# Patient Record
Sex: MALE | Race: OTHER | Employment: FULL TIME | URBAN - NONMETROPOLITAN AREA
[De-identification: names, ages, dates, MRNs, and addresses within clinical notes are randomized per-mention and may not be internally consistent; named-entity substitution may affect disease eponyms.]

---

## 2017-04-08 DIAGNOSIS — I10 ESSENTIAL HYPERTENSION, BENIGN: ICD-10-CM

## 2017-04-08 DIAGNOSIS — L24.9 IRRITANT CONTACT DERMATITIS, UNSPECIFIED TRIGGER: ICD-10-CM

## 2017-04-08 RX ORDER — LISINOPRIL 10 MG/1
TABLET ORAL
Qty: 30 TABLET | Refills: 0 | Status: SHIPPED | OUTPATIENT
Start: 2017-04-08 | End: 2017-05-03

## 2017-04-08 RX ORDER — AMLODIPINE BESYLATE 5 MG/1
TABLET ORAL
Qty: 30 TABLET | Refills: 0 | Status: SHIPPED | OUTPATIENT
Start: 2017-04-08 | End: 2017-05-03

## 2017-04-08 NOTE — TELEPHONE ENCOUNTER
Amlodipine 5 mg #30/0, last refill 8/05/16   Lisinopril 10 mg #30/5, last refill  8/17/16  Last OV 8/17/16  Last Lipid, COMP 8/17/16

## 2017-04-10 RX ORDER — GLIMEPIRIDE 4 MG/1
4 TABLET ORAL
Qty: 30 TABLET | Refills: 0 | Status: SHIPPED | OUTPATIENT
Start: 2017-04-10 | End: 2017-11-06

## 2017-04-10 RX ORDER — LISINOPRIL 10 MG/1
10 TABLET ORAL DAILY
Qty: 30 TABLET | Refills: 5 | OUTPATIENT
Start: 2017-04-10

## 2017-04-10 RX ORDER — DESOXIMETASONE 0.5 MG/G
CREAM TOPICAL
Qty: 60 G | Refills: 0 | Status: SHIPPED | OUTPATIENT
Start: 2017-04-10 | End: 2017-11-07

## 2017-04-10 NOTE — TELEPHONE ENCOUNTER
Last office visit: 8/17/16, /86  Last A1c/CMP: 8/17/16   Last refill: Lisinopril 4/8/17 # 30 with 0 refills, requested too soon.                   Desoximetasone 8/17/16 # 60 G with 0 refills                 Glimepiride 8/18/16 # 30 with 2 refills

## 2017-04-10 NOTE — TELEPHONE ENCOUNTER
From: [facility]  To: Denys Manzo MD  Sent: 4/8/2017 9:29 AM CDT  Subject: Medication Renewal Request    Original authorizing provider: MD Casa Page would like a refill of the following medications:  lisinopril 10

## 2017-05-03 RX ORDER — LISINOPRIL 10 MG/1
TABLET ORAL
Qty: 30 TABLET | Refills: 0 | Status: SHIPPED | OUTPATIENT
Start: 2017-05-03 | End: 2017-11-06

## 2017-05-03 RX ORDER — AMLODIPINE BESYLATE 5 MG/1
TABLET ORAL
Qty: 30 TABLET | Refills: 0 | Status: SHIPPED | OUTPATIENT
Start: 2017-05-03 | End: 2017-11-06

## 2017-05-03 NOTE — TELEPHONE ENCOUNTER
Last office visit: 8/17/16, /80   Last CMP: 8/17/16  Last refill: Lisinopril 4/8/17 # 30 with 0 refills                 Norvasc 4/8/17 # 30 with 0 refills      No future appointments.  Patient is aware he has to schedule an appointment when he returns

## 2017-07-06 RX ORDER — LISINOPRIL 10 MG/1
TABLET ORAL
Qty: 30 TABLET | Refills: 0
Start: 2017-07-06

## 2017-07-06 RX ORDER — AMLODIPINE BESYLATE 5 MG/1
TABLET ORAL
Qty: 30 TABLET | Refills: 0
Start: 2017-07-06

## 2017-07-06 NOTE — TELEPHONE ENCOUNTER
From: Wong Bangura  Sent: 7/6/2017 9:51 AM CDT  Subject: Medication Renewal Request    Wong Bangura would like a refill of the following medications:  lisinopril 10 MG Oral Tab [Jaiden Salvador MD]  AmLODIPine Besylate 5 MG Oral Tab [Jaiden BOSTON

## 2017-11-06 ENCOUNTER — APPOINTMENT (OUTPATIENT)
Dept: LAB | Age: 57
End: 2017-11-06
Attending: FAMILY MEDICINE
Payer: COMMERCIAL

## 2017-11-06 ENCOUNTER — OFFICE VISIT (OUTPATIENT)
Dept: FAMILY MEDICINE CLINIC | Facility: CLINIC | Age: 57
End: 2017-11-06

## 2017-11-06 VITALS
HEART RATE: 90 BPM | OXYGEN SATURATION: 97 % | BODY MASS INDEX: 27.22 KG/M2 | WEIGHT: 169.38 LBS | DIASTOLIC BLOOD PRESSURE: 96 MMHG | TEMPERATURE: 99 F | SYSTOLIC BLOOD PRESSURE: 146 MMHG | HEIGHT: 66 IN

## 2017-11-06 DIAGNOSIS — Z00.00 LABORATORY EXAM ORDERED AS PART OF ROUTINE GENERAL MEDICAL EXAMINATION: ICD-10-CM

## 2017-11-06 DIAGNOSIS — Z00.00 ROUTINE GENERAL MEDICAL EXAMINATION AT A HEALTH CARE FACILITY: Primary | ICD-10-CM

## 2017-11-06 DIAGNOSIS — I10 POOR HYPERTENSION CONTROL: ICD-10-CM

## 2017-11-06 DIAGNOSIS — R73.9 HYPERGLYCEMIA: ICD-10-CM

## 2017-11-06 DIAGNOSIS — Z23 NEED FOR INFLUENZA VACCINATION: ICD-10-CM

## 2017-11-06 DIAGNOSIS — Z12.5 SCREENING PSA (PROSTATE SPECIFIC ANTIGEN): ICD-10-CM

## 2017-11-06 DIAGNOSIS — Z13.1 SCREENING FOR DIABETES MELLITUS: ICD-10-CM

## 2017-11-06 DIAGNOSIS — Z91.14 HX OF MEDICATION NONCOMPLIANCE: ICD-10-CM

## 2017-11-06 DIAGNOSIS — N52.9 ERECTILE DYSFUNCTION, UNSPECIFIED ERECTILE DYSFUNCTION TYPE: ICD-10-CM

## 2017-11-06 DIAGNOSIS — Z13.220 LIPID SCREENING: ICD-10-CM

## 2017-11-06 DIAGNOSIS — Z12.11 COLON CANCER SCREENING: ICD-10-CM

## 2017-11-06 PROCEDURE — 80061 LIPID PANEL: CPT | Performed by: FAMILY MEDICINE

## 2017-11-06 PROCEDURE — 82043 UR ALBUMIN QUANTITATIVE: CPT | Performed by: FAMILY MEDICINE

## 2017-11-06 PROCEDURE — 99396 PREV VISIT EST AGE 40-64: CPT | Performed by: FAMILY MEDICINE

## 2017-11-06 PROCEDURE — 36415 COLL VENOUS BLD VENIPUNCTURE: CPT | Performed by: FAMILY MEDICINE

## 2017-11-06 PROCEDURE — 90686 IIV4 VACC NO PRSV 0.5 ML IM: CPT | Performed by: FAMILY MEDICINE

## 2017-11-06 PROCEDURE — 90471 IMMUNIZATION ADMIN: CPT | Performed by: FAMILY MEDICINE

## 2017-11-06 PROCEDURE — 84153 ASSAY OF PSA TOTAL: CPT | Performed by: FAMILY MEDICINE

## 2017-11-06 PROCEDURE — 80053 COMPREHEN METABOLIC PANEL: CPT | Performed by: FAMILY MEDICINE

## 2017-11-06 PROCEDURE — 82570 ASSAY OF URINE CREATININE: CPT | Performed by: FAMILY MEDICINE

## 2017-11-06 PROCEDURE — 83036 HEMOGLOBIN GLYCOSYLATED A1C: CPT | Performed by: FAMILY MEDICINE

## 2017-11-06 RX ORDER — AMLODIPINE BESYLATE 5 MG/1
TABLET ORAL
Qty: 90 TABLET | Refills: 0 | Status: SHIPPED | OUTPATIENT
Start: 2017-11-06 | End: 2018-02-12

## 2017-11-06 RX ORDER — GLIMEPIRIDE 4 MG/1
4 TABLET ORAL
Qty: 90 TABLET | Refills: 0 | Status: SHIPPED | OUTPATIENT
Start: 2017-11-06 | End: 2018-09-04

## 2017-11-06 RX ORDER — SILDENAFIL 100 MG/1
100 TABLET, FILM COATED ORAL AS NEEDED
Qty: 4 TABLET | Refills: 0 | COMMUNITY
Start: 2017-11-06 | End: 2020-04-15 | Stop reason: ALTCHOICE

## 2017-11-06 RX ORDER — LISINOPRIL 10 MG/1
TABLET ORAL
Qty: 90 TABLET | Refills: 0 | Status: SHIPPED | OUTPATIENT
Start: 2017-11-06 | End: 2018-02-12

## 2017-11-06 NOTE — PROGRESS NOTES
Lisa Rowe is a 62year old male. CC:  Patient presents with:  Physical: room 5      HPI:  Patient here for follow-up. Physical exam as well. Patient has been out of his medicines for several months now.   He does not know his blood pres  08/17/2016 03:24 PM   K 3.5 (L) 08/17/2016 03:24 PM    08/17/2016 03:24 PM   CO2 27.0 08/17/2016 03:24 PM   CREATSERUM 1.22 08/17/2016 03:24 PM   CA 8.8 08/17/2016 03:24 PM   ALB 3.8 08/17/2016 03:24 PM   TP 7.4 08/17/2016 03:24 PM   ALKPHO anxiety  HEMATOLOGIC: denies hx of anemia  ENDOCRINE: denies thyroid history  ALL/ASTHMA: denies hx of allergy or asthma    EXAM:   Blood pressure 146/96, pulse 90, temperature 98.5 °F (36.9 °C), temperature source Temporal, height 66\", weight 169 lb 6 oz HEMOGLOBIN A1C    MICROALB/CREAT RATIO, RANDOM URINE    PSA SCREEN    Screening for diabetes mellitus        Relevant Orders    COMP METABOLIC PANEL (14)    HEMOGLOBIN A1C    Hyperglycemia        Relevant Medications    glimepiride 4 MG Oral Tab    Othe VACCINE QUAD PRESERVATIVE FREE 0.5 ML  -     glimepiride 4 MG Oral Tab;  Take 1 tablet (4 mg total) by mouth every morning before breakfast.          Meds & Refills for this Visit:  Signed Prescriptions Disp Refills    AmLODIPine Besylate 5 MG Oral Tab 90 t

## 2017-11-06 NOTE — ASSESSMENT & PLAN NOTE
Attempt 50 mg per dose, and if unsuccessful increased to 100 mg. Samples given. Will prescribe if this is working, stressed very strongly that this also may be a side effect of poor medical compliance with his sugar and blood pressure.

## 2017-11-07 DIAGNOSIS — L24.9 IRRITANT CONTACT DERMATITIS, UNSPECIFIED TRIGGER: ICD-10-CM

## 2017-11-07 RX ORDER — DESOXIMETASONE 0.5 MG/G
CREAM TOPICAL
Qty: 60 G | Refills: 0 | Status: SHIPPED | OUTPATIENT
Start: 2017-11-07 | End: 2018-02-14

## 2017-11-07 NOTE — TELEPHONE ENCOUNTER
From: James Mendez  Sent: 11/7/2017 4:30 PM CST  Subject: Medication Renewal Request    James Mendez would like a refill of the following medications:     Desoximetasone 0.05 % External Cream [Jaiden Salvador MD]    [facility]

## 2017-11-08 ENCOUNTER — PATIENT MESSAGE (OUTPATIENT)
Dept: FAMILY MEDICINE CLINIC | Facility: CLINIC | Age: 57
End: 2017-11-08

## 2017-11-08 NOTE — TELEPHONE ENCOUNTER
From: Carolina Sanchez  To: Keith Rodriguez MD  Sent: 11/8/2017 1:49 PM CST  Subject: Prescription Question    Dr. Kailee Salvador, I've seen the Lab. Results, do I need Glucophage in addition to Glimepiride?    Best regards and thanks,  [facility]

## 2017-12-11 RX ORDER — AZITHROMYCIN 250 MG/1
TABLET, FILM COATED ORAL
Qty: 6 TABLET | Refills: 0 | Status: SHIPPED | OUTPATIENT
Start: 2017-12-11 | End: 2017-12-16

## 2018-02-02 DIAGNOSIS — L24.9 IRRITANT CONTACT DERMATITIS, UNSPECIFIED TRIGGER: ICD-10-CM

## 2018-02-02 NOTE — TELEPHONE ENCOUNTER
Last office visit: 11/6/2017    Last refill: 11/7/2017    Pending Prescriptions Disp Refills    DESOXIMETASONE 0.05 % External Cream [Pharmacy Med Name: Rosa Higgins 0.05%   CRE]  0     Sig: APPLY A THIN LAYER OF CREAM TO AFFECTED AREA TWICE DAILY AS NEEDED

## 2018-02-05 RX ORDER — DESOXIMETASONE 0.5 MG/G
CREAM TOPICAL
Qty: 30 G | Refills: 0 | Status: SHIPPED | OUTPATIENT
Start: 2018-02-05 | End: 2018-02-14 | Stop reason: CLARIF

## 2018-02-12 DIAGNOSIS — I10 POOR HYPERTENSION CONTROL: ICD-10-CM

## 2018-02-12 RX ORDER — AMLODIPINE BESYLATE 5 MG/1
TABLET ORAL
Qty: 90 TABLET | Refills: 0 | Status: SHIPPED | OUTPATIENT
Start: 2018-02-12 | End: 2018-05-21

## 2018-02-12 RX ORDER — LISINOPRIL 10 MG/1
TABLET ORAL
Qty: 90 TABLET | Refills: 0 | Status: SHIPPED | OUTPATIENT
Start: 2018-02-12 | End: 2018-05-21

## 2018-02-12 NOTE — TELEPHONE ENCOUNTER
Lisinopril - last scripts for both 11/6/17 90 day   Amlodipine       Last BP 11/6/17  146/96  Last lab 11/6/17

## 2018-02-14 RX ORDER — DESOXIMETASONE 0.5 MG/G
CREAM TOPICAL
Qty: 60 G | Refills: 0 | Status: SHIPPED | OUTPATIENT
Start: 2018-02-14 | End: 2018-05-03

## 2018-05-03 DIAGNOSIS — L24.9 IRRITANT CONTACT DERMATITIS, UNSPECIFIED TRIGGER: ICD-10-CM

## 2018-05-04 RX ORDER — DESOXIMETASONE 0.5 MG/G
CREAM TOPICAL
Qty: 60 G | Refills: 0 | Status: SHIPPED | OUTPATIENT
Start: 2018-05-04 | End: 2018-06-14

## 2018-05-04 NOTE — TELEPHONE ENCOUNTER
From: [facility]  Sent: 5/3/2018 5:18 PM CDT  Subject: Medication Renewal Request    [facility] would like a refill of the following medications:     Desoximetasone 0.05 % External Cream [Jaiden Salvador MD]    Preferred pharmacy: [facility]

## 2018-05-21 DIAGNOSIS — I10 POOR HYPERTENSION CONTROL: ICD-10-CM

## 2018-05-21 RX ORDER — LISINOPRIL 10 MG/1
10 TABLET ORAL DAILY
Qty: 90 TABLET | Refills: 0 | Status: SHIPPED | OUTPATIENT
Start: 2018-05-21 | End: 2018-08-30

## 2018-05-21 RX ORDER — AMLODIPINE BESYLATE 5 MG/1
5 TABLET ORAL DAILY
Qty: 90 TABLET | Refills: 0 | Status: SHIPPED | OUTPATIENT
Start: 2018-05-21 | End: 2018-08-30

## 2018-05-21 NOTE — TELEPHONE ENCOUNTER
From: Kadeem Patel  Sent: 5/21/2018 12:28 AM CDT  Subject: Medication Renewal Request    Kadeem Patel would like a refill of the following medications:     LISINOPRIL 10 MG Oral Tab [Jaiden Salvador MD]     AMLODIPINE BESYLATE 5 MG Oral Tab

## 2018-06-14 DIAGNOSIS — L24.9 IRRITANT CONTACT DERMATITIS, UNSPECIFIED TRIGGER: ICD-10-CM

## 2018-06-14 NOTE — TELEPHONE ENCOUNTER
From: Brooklyn Yu  Sent: 6/14/2018 4:31 PM CDT  Subject: Medication Renewal Request    Brooklyn Yu would like a refill of the following medications:     Desoximetasone 0.05 % External Cream [Jaiden Salvador MD]    [facility]

## 2018-06-15 RX ORDER — DESOXIMETASONE 0.5 MG/G
CREAM TOPICAL
Qty: 60 G | Refills: 0 | Status: SHIPPED | OUTPATIENT
Start: 2018-06-15 | End: 2018-06-16

## 2018-06-16 ENCOUNTER — TELEPHONE (OUTPATIENT)
Dept: FAMILY MEDICINE CLINIC | Facility: CLINIC | Age: 58
End: 2018-06-16

## 2018-06-16 DIAGNOSIS — L24.9 IRRITANT CONTACT DERMATITIS, UNSPECIFIED TRIGGER: ICD-10-CM

## 2018-06-16 RX ORDER — DESOXIMETASONE 0.5 MG/G
CREAM TOPICAL
Qty: 60 G | Refills: 0 | COMMUNITY
Start: 2018-06-16 | End: 2018-07-29

## 2018-07-09 NOTE — TELEPHONE ENCOUNTER
From: Evelyne Kraus  Sent: 7/9/2018 10:49 AM CDT  Subject: Medication Renewal Request    Evelyne Kraus would like a refill of the following medications:     METFORMIN  MG Oral Tab [Jaiden Salvador MD]    [facility]

## 2018-07-29 DIAGNOSIS — L24.9 IRRITANT CONTACT DERMATITIS, UNSPECIFIED TRIGGER: ICD-10-CM

## 2018-07-30 RX ORDER — DESOXIMETASONE 0.5 MG/G
CREAM TOPICAL
Qty: 60 G | Refills: 0 | Status: SHIPPED
Start: 2018-07-30 | End: 2020-04-15

## 2018-07-30 NOTE — TELEPHONE ENCOUNTER
Last OV: 11/6/2017  Last filled: 6/16/2018 60g no RF    No future appointments.   [employer] message sent advising pt he is due for an office visit

## 2018-07-30 NOTE — TELEPHONE ENCOUNTER
From: Evelyne Kraus  Sent: 7/29/2018 8:39 AM CDT  Subject: Medication Renewal Request    Evelyne Kraus would like a refill of the following medications:     Desoximetasone 0.05 % External Cream    Pref[facility] pharmacy: [id number]

## 2018-08-11 NOTE — TELEPHONE ENCOUNTER
From: Wong Bangura  Sent: 8/10/2018 4:32 PM CDT  Subject: Medication Renewal Request    Wong Bangura would like a refill of the following medications:     MetFORMIN HCl 500 MG Oral Tab [Jaiden Salvador MD]    Preferred [facility]: [facility]

## 2018-08-11 NOTE — TELEPHONE ENCOUNTER
Last OV: 11/6/2017  Last labs: 11/6/2017  Last filled: 7/9/20118 #30 no RF    No future appointments.

## 2018-08-30 DIAGNOSIS — I10 POOR HYPERTENSION CONTROL: ICD-10-CM

## 2018-08-30 NOTE — TELEPHONE ENCOUNTER
Last office visit 11-6-17 146/96  Last refill both meds 5-21-18 #90 of each  No future appointments. Requested refills of 90 reduced to 30. ugichemt message sent to patient advising he needs to be seen and have labs done.

## 2018-08-30 NOTE — TELEPHONE ENCOUNTER
From: Lorena Cohn  Sent: 8/30/2018 3:00 PM CDT  Subject: Medication Renewal Request    Lorena Cohn would like a refill of the following medications:     lisinopril 10 MG Oral Tab [Jaiden Salvador MD]     AmLODIPine Besylate 5 MG Oral Tab

## 2018-08-31 RX ORDER — AMLODIPINE BESYLATE 5 MG/1
5 TABLET ORAL DAILY
Qty: 30 TABLET | Refills: 0 | Status: SHIPPED | OUTPATIENT
Start: 2018-08-31 | End: 2018-11-02

## 2018-08-31 RX ORDER — LISINOPRIL 10 MG/1
10 TABLET ORAL DAILY
Qty: 30 TABLET | Refills: 0 | Status: SHIPPED | OUTPATIENT
Start: 2018-08-31 | End: 2018-11-02

## 2018-09-04 ENCOUNTER — PATIENT MESSAGE (OUTPATIENT)
Dept: FAMILY MEDICINE CLINIC | Facility: CLINIC | Age: 58
End: 2018-09-04

## 2018-09-04 RX ORDER — GLIMEPIRIDE 4 MG/1
TABLET ORAL
Qty: 30 TABLET | Refills: 0 | Status: SHIPPED | OUTPATIENT
Start: 2018-09-04 | End: 2020-04-15 | Stop reason: ALTCHOICE

## 2018-09-04 NOTE — TELEPHONE ENCOUNTER
From: Melinda Christina  To: Clive Joe MD  Sent: 9/4/2018 11:09 AM CDT  Subject: Other    ran-out of test blood sugar strips  \"FREESTYLE LITE Blood Glucose Test   Strips\". ..  thanks. ..

## 2018-09-04 NOTE — TELEPHONE ENCOUNTER
Last OV: 11/6/2017  Last labs: 11/6/2017  Last filled: 11/6/2017 #90 no RF    No future appointments.

## 2018-09-06 RX ORDER — BLOOD-GLUCOSE METER
KIT MISCELLANEOUS
Qty: 100 STRIP | Refills: 0 | Status: SHIPPED | OUTPATIENT
Start: 2018-09-06 | End: 2020-05-04

## 2018-09-17 DIAGNOSIS — L24.9 IRRITANT CONTACT DERMATITIS, UNSPECIFIED TRIGGER: ICD-10-CM

## 2018-09-17 RX ORDER — DESOXIMETASONE 0.5 MG/G
CREAM TOPICAL
Qty: 15 G | Refills: 0 | Status: SHIPPED | OUTPATIENT
Start: 2018-09-17 | End: 2020-04-15 | Stop reason: ALTCHOICE

## 2018-09-17 NOTE — TELEPHONE ENCOUNTER
Last OV: 11/6/2017  Last filled: 7/30/2018 60g no RF    Reduced amount dispensed as pt needs to be seen

## 2018-11-02 DIAGNOSIS — I10 POOR HYPERTENSION CONTROL: ICD-10-CM

## 2018-11-02 RX ORDER — AMLODIPINE BESYLATE 5 MG/1
TABLET ORAL
Qty: 30 TABLET | Refills: 0 | Status: SHIPPED | OUTPATIENT
Start: 2018-11-02 | End: 2019-01-30

## 2018-11-02 RX ORDER — LISINOPRIL 10 MG/1
TABLET ORAL
Qty: 30 TABLET | Refills: 0 | Status: SHIPPED | OUTPATIENT
Start: 2018-11-02 | End: 2019-01-30

## 2018-11-02 NOTE — TELEPHONE ENCOUNTER
Jaiden Patel Nurse   Caller: Jarod Panchal (Today, 10:59 AM)             RETURNING MARISSA B'S. CALL. PLEASE CALL BACK. Wife advised and verbalizes understanding.

## 2018-11-02 NOTE — TELEPHONE ENCOUNTER
Last office visit 11-6-17 146/96  Last refill both meds 8-31-18 #30 of each. No future appointments. Overdue for labs. Left message to call back.

## 2019-01-30 DIAGNOSIS — I10 POOR HYPERTENSION CONTROL: ICD-10-CM

## 2019-01-30 NOTE — TELEPHONE ENCOUNTER
Last office visit 11-6-17 146/96  Last refill Amlodipine, Lisinopril 11-2-18 #30 with note needs office visit. No refills will be authorized until patient is seen in office. Last refill Metformin 8-11-18 with note needs office visit and labs.   No further

## 2019-01-31 RX ORDER — LISINOPRIL 10 MG/1
10 TABLET ORAL
Qty: 15 TABLET | Refills: 0 | Status: SHIPPED | OUTPATIENT
Start: 2019-01-31 | End: 2019-03-11

## 2019-01-31 RX ORDER — AMLODIPINE BESYLATE 5 MG/1
5 TABLET ORAL
Qty: 15 TABLET | Refills: 0 | Status: SHIPPED | OUTPATIENT
Start: 2019-01-31 | End: 2019-03-11

## 2019-02-01 ENCOUNTER — PATIENT OUTREACH (OUTPATIENT)
Dept: FAMILY MEDICINE CLINIC | Facility: CLINIC | Age: 59
End: 2019-02-01

## 2019-02-22 ENCOUNTER — PATIENT OUTREACH (OUTPATIENT)
Dept: FAMILY MEDICINE CLINIC | Facility: CLINIC | Age: 59
End: 2019-02-22

## 2019-03-11 DIAGNOSIS — I10 POOR HYPERTENSION CONTROL: ICD-10-CM

## 2019-03-11 RX ORDER — LISINOPRIL 10 MG/1
10 TABLET ORAL
Qty: 7 TABLET | Refills: 0 | Status: SHIPPED | OUTPATIENT
Start: 2019-03-11 | End: 2020-04-15

## 2019-03-11 RX ORDER — AMLODIPINE BESYLATE 5 MG/1
5 TABLET ORAL
Qty: 7 TABLET | Refills: 0 | Status: SHIPPED | OUTPATIENT
Start: 2019-03-11 | End: 2020-04-15

## 2019-03-11 NOTE — TELEPHONE ENCOUNTER
Last OV; 11/6/2017  Last labs: 11/6/2017  All meds last filled 1/31/2019 for #15    No future appointments.

## 2019-05-20 ENCOUNTER — PATIENT OUTREACH (OUTPATIENT)
Dept: FAMILY MEDICINE CLINIC | Facility: CLINIC | Age: 59
End: 2019-05-20

## 2020-04-15 ENCOUNTER — TELEMEDICINE (OUTPATIENT)
Dept: FAMILY MEDICINE CLINIC | Facility: CLINIC | Age: 60
End: 2020-04-15
Payer: COMMERCIAL

## 2020-04-15 DIAGNOSIS — R73.9 HYPERGLYCEMIA: ICD-10-CM

## 2020-04-15 DIAGNOSIS — S33.9XXA SPRAIN OF LIGAMENT OF LUMBOSACRAL JOINT, INITIAL ENCOUNTER: ICD-10-CM

## 2020-04-15 DIAGNOSIS — I10 POOR HYPERTENSION CONTROL: ICD-10-CM

## 2020-04-15 DIAGNOSIS — I10 ESSENTIAL HYPERTENSION, BENIGN: ICD-10-CM

## 2020-04-15 DIAGNOSIS — Z79.899 ENCOUNTER FOR LONG-TERM (CURRENT) USE OF MEDICATIONS: Primary | ICD-10-CM

## 2020-04-15 PROCEDURE — 99214 OFFICE O/P EST MOD 30 MIN: CPT | Performed by: FAMILY MEDICINE

## 2020-04-15 RX ORDER — LISINOPRIL 10 MG/1
10 TABLET ORAL
Qty: 7 TABLET | Refills: 0 | Status: CANCELLED | OUTPATIENT
Start: 2020-04-15

## 2020-04-15 RX ORDER — AMLODIPINE BESYLATE 5 MG/1
5 TABLET ORAL
Qty: 7 TABLET | Refills: 0 | Status: CANCELLED | OUTPATIENT
Start: 2020-04-15

## 2020-04-15 RX ORDER — LISINOPRIL 10 MG/1
10 TABLET ORAL
Qty: 90 TABLET | Refills: 0 | Status: SHIPPED | OUTPATIENT
Start: 2020-04-15 | End: 2020-05-04 | Stop reason: DRUGHIGH

## 2020-04-15 RX ORDER — AMLODIPINE BESYLATE 5 MG/1
5 TABLET ORAL
Qty: 90 TABLET | Refills: 0 | Status: SHIPPED | OUTPATIENT
Start: 2020-04-15 | End: 2020-05-13

## 2020-04-15 NOTE — TELEPHONE ENCOUNTER
Amlodipine: 3/11/19 #7 w/ 0 refills  Lisinopril: 3/11/19 #7 w/ 0 refills  Metformin: 3/11/19 #7 w/ 0 refills    Last OV: 11/6/17  Last labs: 11/6/17    No future appointments.

## 2020-04-15 NOTE — PROGRESS NOTES
Virtual/Telephone Check-In    [facility]  verbally consents to a Virtual/Telephone Check-In service on 4/15/2020 .   Patient understands and accepts financial responsibility for any deductible, co-insurance and/or co-pays associated with this ser allergies and chart reviewed    COVID-19 protocol      HPI:   Alyssa Monreal is a 61year old male who   Patient has not been in the office for over 2 years. Because of his conditions we have been refilling medicines.     We were able to establish a Tab Take 1 tablet (5 mg total) by mouth once daily. 90 tablet 0   • Multiple Vitamins-Minerals (MULTI ADULT GUMMIES OR) Take by mouth.         Past Medical History:   Diagnosis Date   • Essential hypertension, benign 1/8/2016   • Poor hypertension control 8 hypertension control    Current Assessment & Plan     Refill medication but patient does need to send home blood pressure readings to me when COVID crisis is over we will check in the office           Relevant Medications    lisinopril 10 MG Oral Tab    am are limitations of this visit as no physical exam could be performed. Every conscious effort was taken to allow for sufficient and adequate time. This billing was spent on reviewing labs, medications, radiology tests and decision making.   Appropriate med

## 2020-04-15 NOTE — ASSESSMENT & PLAN NOTE
Refill medication but patient does need to send home blood pressure readings to me when COVID crisis is over we will check in the office

## 2020-04-15 NOTE — TELEPHONE ENCOUNTER
There is been no office visit for 2-1/2 or more years. Patient is indicated as having my chart. We gave him limited refills hoping that we would hear from him.     Let him know that we are capable of doing a video visit which is preferred before I would

## 2020-04-16 ENCOUNTER — TELEPHONE (OUTPATIENT)
Dept: FAMILY MEDICINE CLINIC | Facility: CLINIC | Age: 60
End: 2020-04-16

## 2020-04-16 VITALS — WEIGHT: 160.5 LBS | SYSTOLIC BLOOD PRESSURE: 171 MMHG | BODY MASS INDEX: 26 KG/M2 | DIASTOLIC BLOOD PRESSURE: 99 MMHG

## 2020-04-16 NOTE — TELEPHONE ENCOUNTER
DID PHONE VISIT YESTERDAY WITH , WAS TOLD TO CALL BACK WITH THE FOLLOWING TO BE PUT IN HIS RECORD: HIS GLUCOSE READING TODAY 97, WEIGHT 160.5, & /99

## 2020-04-16 NOTE — TELEPHONE ENCOUNTER
Detailed message left for patient of Dr. Madhav Link recommendations. Patient instructed to call back if he has any questions or concerns. Patient also notified that his paperwork has been faxed to his employer.

## 2020-04-16 NOTE — TELEPHONE ENCOUNTER
Patient's blood pressure is elevated. I would consider increasing his lisinopril, from the 10 mg daily, up to 2 tablets of the 10 mg equaling 20 mg daily he can take them both at the same time.   Then also I would have him keep a log of daily blood pressur

## 2020-04-16 NOTE — TELEPHONE ENCOUNTER
Dr Salvador   I added vitals per pt and glucose result to yesterdays video visit    Please note elevated B/P reading 171/99

## 2020-05-01 RX ORDER — LISINOPRIL 20 MG/1
20 TABLET ORAL DAILY
Qty: 90 TABLET | Refills: 0 | Status: SHIPPED | OUTPATIENT
Start: 2020-05-01 | End: 2020-07-30

## 2020-05-01 NOTE — TELEPHONE ENCOUNTER
Last Office Visit: telemed vist 4/15/2020  Last Refill: 4/15/20 #90 no refill  Last Labs: 2017, next labs ordered    Spoke with patient. He states at the telemed visit Dr. Clayton Esparza increased him from 10 mg to 20 mg. He has doubling his med now.   His BP toda

## 2020-05-01 NOTE — TELEPHONE ENCOUNTER
This is good  I sent prescription    Please have him set up  a   telehealth/video visit as able week of 5/4/2020 to discuss blood pressure again

## 2020-05-04 ENCOUNTER — TELEMEDICINE (OUTPATIENT)
Dept: FAMILY MEDICINE CLINIC | Facility: CLINIC | Age: 60
End: 2020-05-04
Payer: COMMERCIAL

## 2020-05-04 DIAGNOSIS — Z79.899 ENCOUNTER FOR LONG-TERM (CURRENT) USE OF MEDICATIONS: Primary | ICD-10-CM

## 2020-05-04 DIAGNOSIS — I10 ESSENTIAL HYPERTENSION, BENIGN: ICD-10-CM

## 2020-05-04 DIAGNOSIS — R73.9 HYPERGLYCEMIA: ICD-10-CM

## 2020-05-04 PROCEDURE — 99214 OFFICE O/P EST MOD 30 MIN: CPT | Performed by: FAMILY MEDICINE

## 2020-05-04 RX ORDER — BLOOD-GLUCOSE METER
KIT MISCELLANEOUS
Qty: 100 STRIP | Refills: 0 | Status: SHIPPED | OUTPATIENT
Start: 2020-05-04 | End: 2021-05-02

## 2020-05-04 NOTE — PROGRESS NOTES
Virtual/Telephone Check-In    Shane Hernandez  verbally consents to a Virtual/Telephone Check-In service on 5/4/2020 .   Patient understands and accepts financial responsibility for any deductible, co-insurance and/or co-pays associated with this serv to 30 days. 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         Allerg oriented during the business the visit, and had no difficulty with respiration. He was appropriate. No obvious signs of dyspnea.     ASSESSMENT AND PLAN:     Encounter for long-term (current) use of medications  (primary encounter diagnosis)  Essential hy this Visit:  Requested Prescriptions     Signed Prescriptions Disp Refills   • Glucose Blood (FREESTYLE LITE TEST) In Vitro Strip 100 strip 0     Sig: Use as directed to test blood sugar up to 3 times daily       Imaging & Consults:  None    Return in abou

## 2020-05-13 DIAGNOSIS — I10 ESSENTIAL HYPERTENSION, BENIGN: ICD-10-CM

## 2020-05-13 DIAGNOSIS — R73.9 HYPERGLYCEMIA: ICD-10-CM

## 2020-05-13 DIAGNOSIS — I10 POOR HYPERTENSION CONTROL: ICD-10-CM

## 2020-05-13 RX ORDER — AMLODIPINE BESYLATE 5 MG/1
5 TABLET ORAL
Qty: 90 TABLET | Refills: 0 | Status: SHIPPED | OUTPATIENT
Start: 2020-05-13 | End: 2020-08-11

## 2020-05-13 NOTE — TELEPHONE ENCOUNTER
Metformin  Last refill: 07/14/20  Qty: 90  W/ 0 refills  Last ov: 05/04/20    Amlodipine   Last refill: 04/15/20  Qty: 90  W/ 0 refills  Last ov: 05/04/20      Requested Prescriptions     Pending Prescriptions Disp Refills   • metFORMIN HCl 500 MG Oral Tab

## 2020-06-11 ENCOUNTER — MED REC SCAN ONLY (OUTPATIENT)
Dept: FAMILY MEDICINE CLINIC | Facility: CLINIC | Age: 60
End: 2020-06-11

## 2020-11-30 DIAGNOSIS — I10 POOR HYPERTENSION CONTROL: ICD-10-CM

## 2020-11-30 DIAGNOSIS — I10 ESSENTIAL HYPERTENSION, BENIGN: ICD-10-CM

## 2020-12-01 RX ORDER — AMLODIPINE BESYLATE 5 MG/1
TABLET ORAL
Qty: 30 TABLET | Refills: 0 | Status: SHIPPED | OUTPATIENT
Start: 2020-12-01 | End: 2021-04-26

## 2020-12-01 NOTE — TELEPHONE ENCOUNTER
LOV 05/04/2020 telemedicine    LAST LAB 11/06/2017    LAST RX  Amlodipine #90 R0 05/13/2020    Next OV No future appointments.       PROTOCOL    Hypertension Medications Protocol Tegrnk6211/30/2020 04:57 PM   CMP or BMP in past 12 months Protocol Details    A

## 2020-12-20 ENCOUNTER — PATIENT MESSAGE (OUTPATIENT)
Dept: FAMILY MEDICINE CLINIC | Facility: CLINIC | Age: 60
End: 2020-12-20

## 2020-12-21 NOTE — TELEPHONE ENCOUNTER
From: Ольга Kellogg  To: Sid William MD  Sent: 12/20/2020 1:38 AM CST  Subject: Other    hi Dr. Massimo Sahni, this is in regards to scheduling vaccination in our facility, my question is, I have a history of Hives in my medical records, can I get vaccina

## 2021-01-16 ENCOUNTER — IMMUNIZATION (OUTPATIENT)
Dept: LAB | Age: 61
End: 2021-01-16

## 2021-01-16 DIAGNOSIS — Z23 NEED FOR VACCINATION: Primary | ICD-10-CM

## 2021-01-16 PROCEDURE — 0011A COVID-19 MODERNA VACCINE: CPT | Performed by: NURSE PRACTITIONER

## 2021-01-16 PROCEDURE — 91301 COVID-19 MODERNA VACCINE: CPT | Performed by: NURSE PRACTITIONER

## 2021-02-13 ENCOUNTER — IMMUNIZATION (OUTPATIENT)
Dept: LAB | Age: 61
End: 2021-02-13
Attending: NURSE PRACTITIONER

## 2021-02-13 DIAGNOSIS — Z23 NEED FOR VACCINATION: Primary | ICD-10-CM

## 2021-02-13 PROCEDURE — 91301 COVID-19 MODERNA VACCINE: CPT

## 2021-02-13 PROCEDURE — 0012A COVID-19 MODERNA VACCINE: CPT

## 2021-04-22 DIAGNOSIS — R73.9 HYPERGLYCEMIA: ICD-10-CM

## 2021-04-23 NOTE — TELEPHONE ENCOUNTER
Last OV: 5/4/20 (telemed)  Last labs: 2017    No future appointments. Due for labs & OV-- [facility] sent.

## 2021-04-26 ENCOUNTER — OFFICE VISIT (OUTPATIENT)
Dept: FAMILY MEDICINE CLINIC | Facility: CLINIC | Age: 61
End: 2021-04-26
Payer: COMMERCIAL

## 2021-04-26 VITALS
RESPIRATION RATE: 12 BRPM | WEIGHT: 161.5 LBS | HEIGHT: 65 IN | TEMPERATURE: 98 F | BODY MASS INDEX: 26.91 KG/M2 | SYSTOLIC BLOOD PRESSURE: 118 MMHG | DIASTOLIC BLOOD PRESSURE: 88 MMHG | OXYGEN SATURATION: 95 % | HEART RATE: 104 BPM

## 2021-04-26 DIAGNOSIS — Z12.11 SCREEN FOR COLON CANCER: ICD-10-CM

## 2021-04-26 DIAGNOSIS — N52.9 ERECTILE DYSFUNCTION, UNSPECIFIED ERECTILE DYSFUNCTION TYPE: ICD-10-CM

## 2021-04-26 DIAGNOSIS — Z00.00 WELL ADULT EXAM: Primary | ICD-10-CM

## 2021-04-26 DIAGNOSIS — R73.9 HYPERGLYCEMIA: ICD-10-CM

## 2021-04-26 DIAGNOSIS — I10 ESSENTIAL HYPERTENSION, BENIGN: ICD-10-CM

## 2021-04-26 PROCEDURE — 83036 HEMOGLOBIN GLYCOSYLATED A1C: CPT | Performed by: FAMILY MEDICINE

## 2021-04-26 PROCEDURE — 82570 ASSAY OF URINE CREATININE: CPT | Performed by: FAMILY MEDICINE

## 2021-04-26 PROCEDURE — 3008F BODY MASS INDEX DOCD: CPT | Performed by: FAMILY MEDICINE

## 2021-04-26 PROCEDURE — 3079F DIAST BP 80-89 MM HG: CPT | Performed by: FAMILY MEDICINE

## 2021-04-26 PROCEDURE — 99396 PREV VISIT EST AGE 40-64: CPT | Performed by: FAMILY MEDICINE

## 2021-04-26 PROCEDURE — 3074F SYST BP LT 130 MM HG: CPT | Performed by: FAMILY MEDICINE

## 2021-04-26 PROCEDURE — 82043 UR ALBUMIN QUANTITATIVE: CPT | Performed by: FAMILY MEDICINE

## 2021-04-26 RX ORDER — LISINOPRIL 10 MG/1
TABLET ORAL
COMMUNITY
End: 2021-04-26

## 2021-04-26 RX ORDER — LISINOPRIL 10 MG/1
10 TABLET ORAL DAILY
Qty: 30 TABLET | Refills: 5 | Status: SHIPPED | OUTPATIENT
Start: 2021-04-26 | End: 2021-10-04

## 2021-04-26 RX ORDER — AMLODIPINE BESYLATE 5 MG/1
5 TABLET ORAL DAILY
Qty: 90 TABLET | Refills: 1 | Status: SHIPPED | OUTPATIENT
Start: 2021-04-26 | End: 2021-10-04

## 2021-04-26 RX ORDER — SILDENAFIL 100 MG/1
100 TABLET, FILM COATED ORAL AS NEEDED
Qty: 10 TABLET | Refills: 1 | Status: SHIPPED | OUTPATIENT
Start: 2021-04-26

## 2021-04-27 NOTE — PROGRESS NOTES
Eran George is a 61year old male. CC:  Patient presents with:  CPX: inrm.  6      HPI/Subjective:    Chief Complaint Reviewed and Verified  Nursing Notes Reviewed and   Verified  Tobacco Reviewed  Allergies Reviewed  Medications Revi Vaccine (23520)                          11/02/2019        Wt Readings from Last 6 Encounters:  04/26/21 : 161 lb 8 oz (73.3 kg)  04/16/20 : 160 lb 8 oz (72.8 kg)  11/06/17 : 169 lb 6 oz (76.8 kg)  08/17/16 : 169 lb (76.7 kg)  01/06/16 : 170 lb 6 oz (77.3 Addressed This Visit        Unprioritized    Essential hypertension, benign    Overview     Blood Pressure and Cardiac Medications          lisinopril 20 MG Oral Tab    amLODIPine Besylate 5 MG Oral Tab                 Current Assessment & Plan     As for Tab 90 tablet 1     Sig: Take 1 tablet (500 mg total) by mouth daily with breakfast.       Imaging & Consults:  None    No follow-ups on file.     Lisa Willams M.D., FAAFP

## 2021-05-22 DIAGNOSIS — I10 ESSENTIAL HYPERTENSION, BENIGN: ICD-10-CM

## 2021-05-22 RX ORDER — AMLODIPINE BESYLATE 5 MG/1
5 TABLET ORAL DAILY
Qty: 90 TABLET | Refills: 1 | Status: CANCELLED | OUTPATIENT
Start: 2021-05-22 | End: 2021-11-18

## 2021-05-22 RX ORDER — LISINOPRIL 10 MG/1
10 TABLET ORAL DAILY
Qty: 30 TABLET | Refills: 5 | Status: CANCELLED | OUTPATIENT
Start: 2021-05-22 | End: 2021-11-18

## 2021-05-22 NOTE — TELEPHONE ENCOUNTER
Amlodipine: 4/26/21 #960 w/ 1 refill-- 30 day was dispensed  Lisinopril:  4/26/21 #30 w/ 5 refills    Last OV: 4/26/21  Last labs: 4/26/21-- A1C and Micro only    No future appointments.         Dispense report shows pt only received 30 days when he

## 2021-10-04 DIAGNOSIS — I10 ESSENTIAL HYPERTENSION, BENIGN: ICD-10-CM

## 2021-10-04 DIAGNOSIS — R73.9 HYPERGLYCEMIA: ICD-10-CM

## 2021-10-04 RX ORDER — AMLODIPINE BESYLATE 5 MG/1
5 TABLET ORAL DAILY
Qty: 90 TABLET | Refills: 0 | Status: SHIPPED | OUTPATIENT
Start: 2021-10-04 | End: 2022-04-02

## 2021-10-04 RX ORDER — LISINOPRIL 10 MG/1
10 TABLET ORAL DAILY
Qty: 30 TABLET | Refills: 5 | Status: SHIPPED | OUTPATIENT
Start: 2021-10-04 | End: 2022-04-02

## 2021-10-04 NOTE — TELEPHONE ENCOUNTER
Lisinopril  Last refill: 04/26/21  Qty: 30  W/ 5 refills  Last ov: 04/26/21    Amlodipine   Last refill: 04/26/21  Qty: 90  W/ 1 refills  Last oV: 04/26/21    Metformin  Last refill: 04/26/21  Qty: 90  W/ 1 refills  Last ov: 04/26/21    Requested Prescript

## 2022-03-31 ENCOUNTER — TELEPHONE (OUTPATIENT)
Dept: FAMILY MEDICINE CLINIC | Facility: CLINIC | Age: 62
End: 2022-03-31

## 2022-08-01 ENCOUNTER — EMPLOYEE HEALTH (OUTPATIENT)
Dept: OTHER | Age: 62
End: 2022-08-01

## 2022-08-03 ENCOUNTER — APPOINTMENT (OUTPATIENT)
Dept: LAB | Age: 62
End: 2022-08-03

## 2022-08-06 ENCOUNTER — MOBILE ENCOUNTER (OUTPATIENT)
Dept: FAMILY MEDICINE CLINIC | Facility: CLINIC | Age: 62
End: 2022-08-06

## 2022-08-06 RX ORDER — NIRMATRELVIR AND RITONAVIR 300-100 MG
KIT ORAL
Qty: 30 TABLET | Refills: 0 | Status: SHIPPED | OUTPATIENT
Start: 2022-08-06 | End: 2022-08-11

## 2022-08-09 ENCOUNTER — EMPLOYEE HEALTH (OUTPATIENT)
Dept: OTHER | Age: 62
End: 2022-08-09

## 2022-08-11 ENCOUNTER — EMPLOYEE HEALTH (OUTPATIENT)
Dept: OTHER | Age: 62
End: 2022-08-11

## 2022-08-19 ENCOUNTER — EMPLOYEE HEALTH (OUTPATIENT)
Dept: OTHER | Age: 62
End: 2022-08-19

## 2022-12-12 ENCOUNTER — MED REC SCAN ONLY (OUTPATIENT)
Dept: FAMILY MEDICINE CLINIC | Facility: CLINIC | Age: 62
End: 2022-12-12

## 2023-01-31 ENCOUNTER — EMPLOYEE HEALTH (OUTPATIENT)
Dept: OTHER | Age: 63
End: 2023-01-31

## 2023-01-31 ENCOUNTER — TELEPHONE (OUTPATIENT)
Dept: FAMILY MEDICINE CLINIC | Facility: CLINIC | Age: 63
End: 2023-01-31

## 2023-01-31 DIAGNOSIS — U07.1 COVID-19: Primary | ICD-10-CM

## 2023-01-31 NOTE — TELEPHONE ENCOUNTER
Wife calls  Patient positive COVID-19  Signs or symptoms bodya aches  Chills cough  No fever  Will send paxlovid to [facility]

## 2023-02-06 ENCOUNTER — EMPLOYEE HEALTH (OUTPATIENT)
Dept: OTHER | Age: 63
End: 2023-02-06

## 2023-03-01 ENCOUNTER — PATIENT OUTREACH (OUTPATIENT)
Dept: FAMILY MEDICINE CLINIC | Facility: CLINIC | Age: 63
End: 2023-03-01

## 2024-01-09 DIAGNOSIS — I10 ESSENTIAL HYPERTENSION, BENIGN: ICD-10-CM

## 2024-01-09 DIAGNOSIS — R73.9 HYPERGLYCEMIA: ICD-10-CM

## 2024-01-09 DIAGNOSIS — N52.9 ERECTILE DYSFUNCTION, UNSPECIFIED ERECTILE DYSFUNCTION TYPE: ICD-10-CM

## 2024-01-16 RX ORDER — SILDENAFIL 100 MG/1
100 TABLET, FILM COATED ORAL AS NEEDED
Qty: 10 TABLET | Refills: 1 | Status: CANCELLED | OUTPATIENT
Start: 2024-01-16

## 2024-01-16 NOTE — TELEPHONE ENCOUNTER
Pt requesting 30 day refills on lisinopril 10 mg, Norvasc 5 mg, and metformin 500 mg, see my chart message patient is aware needs office visit but unable to schedule at this time due to work.    LOV  4-26-21    LAST LAB  4-26-21    LAST RX   Lisinopril 10-4-21 (?)  #30  RF 5                     Norvasc 10-4-21 (?) #90                     Metformin 10-4-21 #90    Next OV  No future appointments.      PROTOCOL  Hypertension Medications Protocol Exuafg0601/16/2024 04:44 PM   Protocol Details CMP or BMP in past 12 months    Appointment in past 6 or next 3 months    Last serum creatinine< 2.0     Diabetic Medication Protocol Lpbfoc4601/16/2024 04:44 PM   Protocol Details HgBA1C procedure resulted in past 6 months    Last HgBA1C < 7.5    Microalbumin procedure in past 12 months or taking ACE/ARB    Appointment in past 6 or next 3 months

## 2024-01-16 NOTE — TELEPHONE ENCOUNTER
See My Chart message, phone number updated as requested. Message sent back to see if patient can provide additional information for main in pharmacy.

## 2024-01-17 RX ORDER — LISINOPRIL 10 MG/1
10 TABLET ORAL DAILY
Qty: 30 TABLET | Refills: 5 | Status: SHIPPED | OUTPATIENT
Start: 2024-01-17 | End: 2024-07-15

## 2024-01-17 RX ORDER — AMLODIPINE BESYLATE 5 MG/1
5 TABLET ORAL DAILY
Qty: 30 TABLET | Refills: 0 | Status: SHIPPED | OUTPATIENT
Start: 2024-01-17 | End: 2024-07-15

## 2024-06-02 DIAGNOSIS — I10 ESSENTIAL HYPERTENSION, BENIGN: ICD-10-CM

## 2024-06-03 NOTE — TELEPHONE ENCOUNTER
OV 04/2021   LABS 04/21    REFILL 01/17/24 #30 +5 RF    No future appointments. Called patient, has not seen PCP since 2021. Needs to call back to inform if PCP/practice has changed.  Hold until he calls back.     Hypertension Medications Protocol Ccapsc0106/02/2024 02:36 PM   Protocol Details CMP or BMP in past 12 months    In person appointment or virtual visit in the past 12 mos or appointment in next 3 mos    EGFRCR or GFRNAA > 50    Last BP reading less than 140/90

## 2024-06-27 DIAGNOSIS — I10 ESSENTIAL HYPERTENSION, BENIGN: ICD-10-CM

## 2024-06-27 NOTE — TELEPHONE ENCOUNTER
OV 04/26/21  LABS 2021    REFILL 01/17/24 #30 +5 RF    No future appointments. Mychart sent - needs to schedule OV / LABS as soon as possible. Has not been seen in over 3 years.     Hypertension Medications Protocol Vencnr1206/27/2024 05:31 AM   Protocol Details CMP or BMP in past 12 months    In person appointment or virtual visit in the past 12 mos or appointment in next 3 mos    EGFRCR or GFRNAA > 50    Last BP reading less than 140/90

## 2024-06-28 RX ORDER — LISINOPRIL 10 MG/1
10 TABLET ORAL DAILY
Qty: 30 TABLET | Refills: 0 | OUTPATIENT
Start: 2024-06-28

## 2024-07-03 RX ORDER — LISINOPRIL 10 MG/1
10 TABLET ORAL DAILY
Qty: 30 TABLET | Refills: 0 | Status: SHIPPED | OUTPATIENT
Start: 2024-07-03

## 2024-07-03 NOTE — TELEPHONE ENCOUNTER
Patient outreach : spoke with patient advised him his due for annual physical patient verbalized full understanding and states he will call back and schedule an appointment

## 2024-08-02 DIAGNOSIS — I10 ESSENTIAL HYPERTENSION, BENIGN: ICD-10-CM

## 2024-08-02 RX ORDER — LISINOPRIL 10 MG/1
10 TABLET ORAL DAILY
Qty: 14 TABLET | Refills: 0 | Status: SHIPPED | OUTPATIENT
Start: 2024-08-02

## 2024-08-02 NOTE — TELEPHONE ENCOUNTER
OV 04/26/21  LABS 04/21    REFILL 07/03/24 #30    No future appointments.    Hypertension Medications Protocol Scuzed9208/02/2024 05:32 AM   Protocol Details CMP or BMP in past 12 months    In person appointment or virtual visit in the past 12 mos or appointment in next 3 mos    EGFRCR or GFRNAA > 50    Last BP reading less than 140/90

## 2025-04-11 ENCOUNTER — PATIENT OUTREACH (OUTPATIENT)
Dept: CASE MANAGEMENT | Age: 65
End: 2025-04-11

## 2025-04-11 NOTE — PROCEDURES
The office order for PCP removal request is Approved and finalized on April 11, 2025.    Removed Jaiden Salvador MD as the patient's Primary Care Physician

## 2025-04-21 ENCOUNTER — APPOINTMENT (OUTPATIENT)
Dept: INTERNAL MEDICINE | Age: 65
End: 2025-04-21

## 2025-05-26 SDOH — ECONOMIC STABILITY: FOOD INSECURITY: WITHIN THE PAST 12 MONTHS, THE FOOD YOU BOUGHT JUST DIDN'T LAST AND YOU DIDN'T HAVE MONEY TO GET MORE.: NEVER TRUE

## 2025-05-26 SDOH — ECONOMIC STABILITY: TRANSPORTATION INSECURITY
IN THE PAST 12 MONTHS, HAS LACK OF RELIABLE TRANSPORTATION KEPT YOU FROM MEDICAL APPOINTMENTS, MEETINGS, WORK OR FROM GETTING THINGS NEEDED FOR DAILY LIVING?: NO

## 2025-05-26 SDOH — ECONOMIC STABILITY: HOUSING INSECURITY: WHAT IS YOUR LIVING SITUATION TODAY?: I HAVE A STEADY PLACE TO LIVE

## 2025-05-26 SDOH — ECONOMIC STABILITY: HOUSING INSECURITY: DO YOU HAVE PROBLEMS WITH ANY OF THE FOLLOWING?: NONE OF THE ABOVE

## 2025-05-26 ASSESSMENT — SOCIAL DETERMINANTS OF HEALTH (SDOH): IN THE PAST 12 MONTHS, HAS THE ELECTRIC, GAS, OIL, OR WATER COMPANY THREATENED TO SHUT OFF SERVICE IN YOUR HOME?: NO

## 2025-05-29 ENCOUNTER — RESULTS FOLLOW-UP (OUTPATIENT)
Dept: INTERNAL MEDICINE | Age: 65
End: 2025-05-29

## 2025-05-29 ENCOUNTER — APPOINTMENT (OUTPATIENT)
Dept: INTERNAL MEDICINE | Age: 65
End: 2025-05-29

## 2025-05-29 ENCOUNTER — LAB SERVICES (OUTPATIENT)
Dept: LAB | Age: 65
End: 2025-05-29

## 2025-05-29 VITALS
OXYGEN SATURATION: 98 % | SYSTOLIC BLOOD PRESSURE: 158 MMHG | BODY MASS INDEX: 23.42 KG/M2 | WEIGHT: 149.2 LBS | HEIGHT: 67 IN | DIASTOLIC BLOOD PRESSURE: 92 MMHG | HEART RATE: 100 BPM | RESPIRATION RATE: 18 BRPM | TEMPERATURE: 97.7 F

## 2025-05-29 DIAGNOSIS — Z23 NEED FOR VACCINATION: ICD-10-CM

## 2025-05-29 DIAGNOSIS — Z12.11 COLON CANCER SCREENING: ICD-10-CM

## 2025-05-29 DIAGNOSIS — E11.9 TYPE 2 DIABETES MELLITUS WITHOUT COMPLICATION, WITHOUT LONG-TERM CURRENT USE OF INSULIN (CMD): ICD-10-CM

## 2025-05-29 DIAGNOSIS — Z11.59 NEED FOR HEPATITIS C SCREENING TEST: ICD-10-CM

## 2025-05-29 DIAGNOSIS — Z00.00 PREVENTATIVE HEALTH CARE: Primary | ICD-10-CM

## 2025-05-29 DIAGNOSIS — I10 ESSENTIAL (PRIMARY) HYPERTENSION: ICD-10-CM

## 2025-05-29 DIAGNOSIS — Z00.00 PREVENTATIVE HEALTH CARE: ICD-10-CM

## 2025-05-29 LAB
ALBUMIN SERPL-MCNC: 4 G/DL (ref 3.4–5)
ALBUMIN/GLOB SERPL: 1.3 {RATIO} (ref 1–2.4)
ALP SERPL-CCNC: 90 UNITS/L (ref 45–117)
ALT SERPL-CCNC: 36 UNITS/L
ANION GAP SERPL CALC-SCNC: 14 MMOL/L (ref 7–19)
APPEARANCE UR: CLEAR
AST SERPL-CCNC: 26 UNITS/L
BACTERIA #/AREA URNS HPF: ABNORMAL /HPF
BASOPHILS # BLD: 0.1 K/MCL (ref 0–0.3)
BASOPHILS NFR BLD: 1 %
BILIRUB SERPL-MCNC: 1.4 MG/DL (ref 0.2–1)
BILIRUB UR QL STRIP: NEGATIVE
BUN SERPL-MCNC: 16 MG/DL (ref 6–20)
BUN/CREAT SERPL: 16 (ref 7–25)
CALCIUM SERPL-MCNC: 9.3 MG/DL (ref 8.4–10.2)
CHLORIDE SERPL-SCNC: 100 MMOL/L (ref 97–110)
CHOLEST SERPL-MCNC: 170 MG/DL
CHOLEST/HDLC SERPL: 3.3 {RATIO}
CO2 SERPL-SCNC: 30 MMOL/L (ref 21–32)
COLOR UR: ABNORMAL
CREAT SERPL-MCNC: 1.02 MG/DL (ref 0.67–1.17)
CREAT UR-MCNC: 37.5 MG/DL
DEPRECATED RDW RBC: 40.9 FL (ref 39–50)
EGFRCR SERPLBLD CKD-EPI 2021: 82 ML/MIN/{1.73_M2}
EOSINOPHIL # BLD: 0.3 K/MCL (ref 0–0.5)
EOSINOPHIL NFR BLD: 5 %
ERYTHROCYTE [DISTWIDTH] IN BLOOD: 12.4 % (ref 11–15)
FASTING DURATION TIME PATIENT: ABNORMAL H
GLOBULIN SER-MCNC: 3.2 G/DL (ref 2–4)
GLUCOSE SERPL-MCNC: 225 MG/DL (ref 70–99)
GLUCOSE UR STRIP-MCNC: 500 MG/DL
HBA1C MFR BLD: 10.9 % (ref 4.5–5.6)
HCT VFR BLD CALC: 47.1 % (ref 39–51)
HCV AB SER QL: NEGATIVE
HDLC SERPL-MCNC: 51 MG/DL
HGB BLD-MCNC: 15.6 G/DL (ref 13–17)
HGB UR QL STRIP: NEGATIVE
HYALINE CASTS #/AREA URNS LPF: ABNORMAL /LPF
IMM GRANULOCYTES # BLD AUTO: 0 K/MCL (ref 0–0.2)
IMM GRANULOCYTES # BLD: 0 %
KETONES UR STRIP-MCNC: NEGATIVE MG/DL
LDLC SERPL CALC-MCNC: 97 MG/DL
LEUKOCYTE ESTERASE UR QL STRIP: NEGATIVE
LYMPHOCYTES # BLD: 1.7 K/MCL (ref 1–4)
LYMPHOCYTES NFR BLD: 27 %
MCH RBC QN AUTO: 29.9 PG (ref 26–34)
MCHC RBC AUTO-ENTMCNC: 33.1 G/DL (ref 32–36.5)
MCV RBC AUTO: 90.4 FL (ref 78–100)
MICROALBUMIN UR-MCNC: 1.28 MG/DL
MICROALBUMIN/CREAT UR: 34.1 MG/G
MONOCYTES # BLD: 0.5 K/MCL (ref 0.3–0.9)
MONOCYTES NFR BLD: 8 %
MUCOUS THREADS URNS QL MICRO: PRESENT
NEUTROPHILS # BLD: 3.6 K/MCL (ref 1.8–7.7)
NEUTROPHILS NFR BLD: 59 %
NITRITE UR QL STRIP: NEGATIVE
NONHDLC SERPL-MCNC: 119 MG/DL
NRBC BLD MANUAL-RTO: 0 /100 WBC
PH UR STRIP: 6.5 [PH] (ref 5–7)
PLATELET # BLD AUTO: 171 K/MCL (ref 140–450)
POTASSIUM SERPL-SCNC: 3.9 MMOL/L (ref 3.4–5.1)
PROT SERPL-MCNC: 7.2 G/DL (ref 6.4–8.2)
PROT UR STRIP-MCNC: NEGATIVE MG/DL
PSA SERPL-MCNC: 0.69 NG/ML
RBC # BLD: 5.21 MIL/MCL (ref 4.5–5.9)
RBC #/AREA URNS HPF: ABNORMAL /HPF
SODIUM SERPL-SCNC: 140 MMOL/L (ref 135–145)
SP GR UR STRIP: 1.01 (ref 1–1.03)
SQUAMOUS #/AREA URNS HPF: ABNORMAL /HPF
TRIGL SERPL-MCNC: 109 MG/DL
UROBILINOGEN UR STRIP-MCNC: 0.2 MG/DL
WBC # BLD: 6.3 K/MCL (ref 4.2–11)
WBC #/AREA URNS HPF: ABNORMAL /HPF

## 2025-05-29 PROCEDURE — 36415 COLL VENOUS BLD VENIPUNCTURE: CPT | Performed by: INTERNAL MEDICINE

## 2025-05-29 PROCEDURE — 86803 HEPATITIS C AB TEST: CPT | Performed by: CLINICAL MEDICAL LABORATORY

## 2025-05-29 PROCEDURE — 80053 COMPREHEN METABOLIC PANEL: CPT | Performed by: INTERNAL MEDICINE

## 2025-05-29 PROCEDURE — 82043 UR ALBUMIN QUANTITATIVE: CPT | Performed by: INTERNAL MEDICINE

## 2025-05-29 PROCEDURE — 85025 COMPLETE CBC W/AUTO DIFF WBC: CPT | Performed by: INTERNAL MEDICINE

## 2025-05-29 PROCEDURE — 80061 LIPID PANEL: CPT | Performed by: INTERNAL MEDICINE

## 2025-05-29 PROCEDURE — 84153 ASSAY OF PSA TOTAL: CPT | Performed by: CLINICAL MEDICAL LABORATORY

## 2025-05-29 PROCEDURE — 83036 HEMOGLOBIN GLYCOSYLATED A1C: CPT | Performed by: INTERNAL MEDICINE

## 2025-05-29 PROCEDURE — 82570 ASSAY OF URINE CREATININE: CPT | Performed by: INTERNAL MEDICINE

## 2025-05-29 RX ORDER — AMLODIPINE BESYLATE 5 MG/1
5 TABLET ORAL DAILY
Qty: 90 TABLET | Refills: 3 | Status: SHIPPED | OUTPATIENT
Start: 2025-05-29

## 2025-05-29 RX ORDER — METFORMIN HYDROCHLORIDE 500 MG/1
500 TABLET, EXTENDED RELEASE ORAL
COMMUNITY
End: 2025-05-29 | Stop reason: SDUPTHER

## 2025-05-29 RX ORDER — METFORMIN HYDROCHLORIDE 500 MG/1
500 TABLET, EXTENDED RELEASE ORAL
Qty: 90 TABLET | Refills: 3 | Status: SHIPPED | OUTPATIENT
Start: 2025-05-29

## 2025-05-29 RX ORDER — LISINOPRIL 10 MG/1
10 TABLET ORAL DAILY
Qty: 90 TABLET | Refills: 3 | Status: SHIPPED | OUTPATIENT
Start: 2025-05-29 | End: 2025-05-29 | Stop reason: DRUGHIGH

## 2025-05-29 RX ORDER — AMLODIPINE BESYLATE 5 MG/1
TABLET ORAL
COMMUNITY
End: 2025-05-29 | Stop reason: SDUPTHER

## 2025-05-29 RX ORDER — LISINOPRIL 5 MG/1
5 TABLET ORAL DAILY
Qty: 90 TABLET | Refills: 3 | Status: SHIPPED | OUTPATIENT
Start: 2025-05-29

## 2025-05-29 RX ORDER — LISINOPRIL 10 MG/1
TABLET ORAL
COMMUNITY
End: 2025-05-29 | Stop reason: SDUPTHER

## 2025-05-29 ASSESSMENT — PATIENT HEALTH QUESTIONNAIRE - PHQ9
SUM OF ALL RESPONSES TO PHQ9 QUESTIONS 1 AND 2: 0
2. FEELING DOWN, DEPRESSED OR HOPELESS: NOT AT ALL
CLINICAL INTERPRETATION OF PHQ2 SCORE: NO FURTHER SCREENING NEEDED
SUM OF ALL RESPONSES TO PHQ9 QUESTIONS 1 AND 2: 0
1. LITTLE INTEREST OR PLEASURE IN DOING THINGS: NOT AT ALL

## 2025-05-30 RX ORDER — ATORVASTATIN CALCIUM 20 MG/1
20 TABLET, FILM COATED ORAL DAILY
Qty: 90 TABLET | Refills: 1 | OUTPATIENT
Start: 2025-05-30

## 2025-05-30 RX ORDER — GLIPIZIDE 5 MG/1
5 TABLET ORAL
Qty: 90 TABLET | Refills: 1 | OUTPATIENT
Start: 2025-05-30

## 2025-06-02 ENCOUNTER — TELEPHONE (OUTPATIENT)
Dept: FAMILY MEDICINE | Age: 65
End: 2025-06-02

## 2025-06-02 DIAGNOSIS — E11.9 TYPE 2 DIABETES MELLITUS WITHOUT COMPLICATION, WITHOUT LONG-TERM CURRENT USE OF INSULIN (CMD): Primary | ICD-10-CM

## 2025-06-02 DIAGNOSIS — I10 ESSENTIAL (PRIMARY) HYPERTENSION: ICD-10-CM

## 2025-06-02 RX ORDER — BLOOD-GLUCOSE METER
EACH MISCELLANEOUS
Qty: 1 EACH | Refills: 0 | Status: SHIPPED | OUTPATIENT
Start: 2025-06-02

## 2025-06-02 RX ORDER — LANCING DEVICE
EACH MISCELLANEOUS
Qty: 100 EACH | Refills: 1 | Status: SHIPPED | OUTPATIENT
Start: 2025-06-02

## 2025-06-06 ENCOUNTER — TELEPHONE (OUTPATIENT)
Dept: INTERNAL MEDICINE | Age: 65
End: 2025-06-06

## 2025-06-06 ENCOUNTER — E-ADVICE (OUTPATIENT)
Dept: INTERNAL MEDICINE | Age: 65
End: 2025-06-06

## 2025-07-30 ENCOUNTER — CASE MANAGEMENT (OUTPATIENT)
Dept: CARE COORDINATION | Age: 65
End: 2025-07-30

## 2025-07-30 DIAGNOSIS — E11.65 TYPE 2 DIABETES MELLITUS WITH HYPERGLYCEMIA, WITHOUT LONG-TERM CURRENT USE OF INSULIN (CMD): Primary | ICD-10-CM

## 2025-09-04 ENCOUNTER — CASE MANAGEMENT (OUTPATIENT)
Dept: CARE COORDINATION | Age: 65
End: 2025-09-04

## (undated) NOTE — LETTER
05/27/21        [facility]  [address] [place] [address]      Dear Lorenzo Flores,    Our records indicate that you have outstanding lab work and or testing that was ordered for you and has not yet been completed:  Orders Placed T

## (undated) NOTE — Clinical Note
04/10/2017        [address] [address]           Dear Ricky Walker,    It has come to our attention that you are due for an office visit and over due for lab work ( A1C and CMP). Your last office visit was 8/17/16.  If you could

## (undated) NOTE — LETTER
05/15/20        [address] [address]          Dear Madan Cordova,    [facility] records indicate that you are due for fasting blood work (lipid panel, CMP, HgA1c) and urine test (microalbumin level) Our new lab hours are 8 am - 12 pm.